# Patient Record
Sex: MALE | Race: WHITE | NOT HISPANIC OR LATINO | Employment: FULL TIME | URBAN - METROPOLITAN AREA
[De-identification: names, ages, dates, MRNs, and addresses within clinical notes are randomized per-mention and may not be internally consistent; named-entity substitution may affect disease eponyms.]

---

## 2021-09-03 ENCOUNTER — OFFICE VISIT (OUTPATIENT)
Dept: UROLOGY | Facility: CLINIC | Age: 21
End: 2021-09-03

## 2021-09-03 VITALS — RESPIRATION RATE: 16 BRPM | HEIGHT: 71 IN | WEIGHT: 141.2 LBS | BODY MASS INDEX: 19.77 KG/M2

## 2021-09-03 DIAGNOSIS — R39.198 DIFFICULTY IN URINATION: ICD-10-CM

## 2021-09-03 DIAGNOSIS — R39.15 URGENCY OF URINATION: Primary | ICD-10-CM

## 2021-09-03 LAB
BILIRUB BLD-MCNC: NEGATIVE MG/DL
CLARITY, POC: CLEAR
COLOR UR: YELLOW
GLUCOSE UR STRIP-MCNC: NEGATIVE MG/DL
KETONES UR QL: NEGATIVE
LEUKOCYTE EST, POC: NEGATIVE
NITRITE UR-MCNC: NEGATIVE MG/ML
PH UR: 7 [PH] (ref 5–8)
PROT UR STRIP-MCNC: NEGATIVE MG/DL
RBC # UR STRIP: NEGATIVE /UL
SCAN: NORMAL
SP GR UR: 1.02 (ref 1–1.03)
UROBILINOGEN UR QL: NORMAL

## 2021-09-03 PROCEDURE — 99203 OFFICE O/P NEW LOW 30 MIN: CPT | Performed by: NURSE PRACTITIONER

## 2021-09-03 PROCEDURE — 51798 US URINE CAPACITY MEASURE: CPT | Performed by: NURSE PRACTITIONER

## 2021-09-03 PROCEDURE — 81003 URINALYSIS AUTO W/O SCOPE: CPT | Performed by: NURSE PRACTITIONER

## 2021-09-03 NOTE — PROGRESS NOTES
"Chief Complaint: Varicocele    Subjective         History of Present Illness  Giovanni Ng is a 20 y.o. male presents to [facility] to be seen for .    He states he tried to join the  and was disqualified from the process as he had a varicocele.    He states he has a \"grade 3 varicocele\"     His father is asking questions about infertility, malignancy, and treatment for this.    The patient has seen [facility] urology in regards to this as well as had imaging performed at [facility].     While in office we were able to obtain the patient's records from [facility] urology.  The patient had a normal genitourinary exam while in office at that time with the exception of the grade 3 varicocele on the right.  Per the office note from [facility] urology the scrotal ultrasound from 6/23/2021 reveals normal testicles bilaterally, abnormally dilated venous structures are noted on the right hemiscrotum but they appear to be localized to the scrotal skin level and not clearly within the scrotal sac.  Moderate sized left varicocele as documented.    It appears that the patient's plan was to call the office with any new voiding symptoms or  issues.  It appears that the physician reviewed how varicocele develops and its role in testicular function i.e. infertility and he was informed to return to the office for palpable mass or scrotal pain or problems with fertility or any other issues.  They recommended no intervention at this point in time and ensured he was able to perform an appropriate self testicular exam.        Objective     History reviewed. No pertinent past medical history.    History reviewed. No pertinent surgical history.    No current outpatient medications on file.    No Known Allergies     History reviewed. No pertinent family history.    Social History     Socioeconomic History   • Marital status: Single     Spouse name: Not on file   • Number of children: Not on file   • Years of " "education: Not on file   • Highest education level: Not on file   Tobacco Use   • Smoking status: Never Smoker   • Smokeless tobacco: Never Used       Vital Signs:   Resp 16   Ht 180.3 cm (71\")   Wt 64 kg (141 lb 3.2 oz)   BMI 19.69 kg/m²      Physical Exam     Result Review :   The following data was reviewed by: KENIA Linda on 09/03/2021:  Results for orders placed or performed in visit on 09/03/21   Bladder Scan   Result Value Ref Range    Scan 012ml    POC Urinalysis Dipstick, Automated    Specimen: Urine   Result Value Ref Range    Color Yellow Yellow, Straw, Dark Yellow, [place]    Clarity, UA Clear Clear    Specific Gravity  1.020 1.005 - 1.030    pH, Urine 7.0 5.0 - 8.0    Leukocytes Negative Negative    Nitrite, UA Negative Negative    Protein, POC Negative Negative mg/dL    Glucose, UA Negative Negative, 1000 mg/dL (3+) mg/dL    Ketones, UA Negative Negative    Urobilinogen, UA Normal Normal    Bilirubin Negative Negative    Blood, UA Negative Negative          Procedures        Assessment and Plan    Diagnoses and all orders for this visit:    1. Urgency of urination (Primary)    2. Difficulty in urination  -     POC Urinalysis Dipstick, Automated    Other orders  -     Bladder Scan        Discussed with patient and patient's father that a left-sided varicocele does not carry an increased risk for malignancy.  We also discussed that should the patient have issues with infertility later in life that go on identified from any other source varicocele may be the cause of this.  We also discussed that surgical intervention is generally not performed unless the patient is having scrotal pain.  Did discuss with the patient that you cannot \"shrink\" the size of a varicocele however he can help to prevent further growth or pain from this with consistent scrotal support and elevation of the scrotum in the evening.    I spent 30 minutes caring for Giovanni on this date of service. This time includes time " spent by me in the following activities:reviewing tests, obtaining and/or reviewing a separately obtained history, performing a medically appropriate examination and/or evaluation , counseling and educating the patient/family/caregiver, ordering medications, tests, or procedures, and documenting information in the medical record  Follow Up   Return if symptoms worsen or fail to improve.  Patient was given instructions and counseling regarding his condition or for health maintenance advice. Please see specific information pulled into the AVS if appropriate.         This document has been electronically signed by KENIA Linda  September 3, 2021 13:58 EDT

## 2021-10-12 ENCOUNTER — TELEPHONE (OUTPATIENT)
Dept: UROLOGY | Facility: CLINIC | Age: 21
End: 2021-10-12

## 2021-10-12 NOTE — TELEPHONE ENCOUNTER
Patient asked for a letter stating that he has a clean bill of health and that he is fit for active duty.  He is trying to join the [employer].  He asked for records from visit.  He can come get.

## 2021-10-12 NOTE — TELEPHONE ENCOUNTER
"I can only speak for the patient urologically if he is wanting a \"clean bill of health\" he will need to see a primary care provider. "

## 2021-10-14 NOTE — TELEPHONE ENCOUNTER
Pt has an varicocele but isn't having any issues at this time. Can we make a note stating this? He is needing this for the

## 2022-02-22 ENCOUNTER — OFFICE VISIT (OUTPATIENT)
Dept: CARDIOLOGY | Facility: CLINIC | Age: 22
End: 2022-02-22

## 2022-02-22 VITALS
WEIGHT: 139.4 LBS | BODY MASS INDEX: 19.44 KG/M2 | DIASTOLIC BLOOD PRESSURE: 64 MMHG | HEART RATE: 72 BPM | SYSTOLIC BLOOD PRESSURE: 111 MMHG

## 2022-02-22 DIAGNOSIS — R94.31 ABNORMAL EKG: Primary | ICD-10-CM

## 2022-02-22 PROCEDURE — 99203 OFFICE O/P NEW LOW 30 MIN: CPT | Performed by: SPECIALIST

## 2022-02-22 NOTE — PROGRESS NOTES
[facility]  Cardiology progress Note    Patient Name: Giovanni Ng  : 2000    CHIEF COMPLAINT  Abnormal EKG      Subjective   Subjective     HISTORY OF PRESENT ILLNESS    Giovanni Ng is a 21 y.o. 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  He denies any chest pain, shortness of breath, palpitations.  He has no syncopal or presyncopal spells.    Review of Systems:   Constitutional no fever,  no weight loss   Skin no rash   Otolaryngeal no difficulty swallowing   Cardiovascular See HPI   Pulmonary no cough, no sputum production   Gastrointestinal no constipation, no diarrhea   Genitourinary no dysuria, no hematuria   Hematologic no easy bruisability, no abnormal bleeding   Musculoskeletal no muscle pain   Neurologic no dizziness, no falls         Personal History     Social History:  reports that he has never smoked. He has never used smokeless tobacco.    Home Medications:  No current outpatient medications on file prior to visit.     No current facility-administered medications on file prior to visit.     Allergies:  No Known Allergies    Objective    Objective       Vitals:   Heart Rate:  [72] 72  BP: (111)/(64) 111/64  Body mass index is 19.44 kg/m².     Physical Exam:   Constitutional: Awake, alert, No acute distress    Eyes: PERRLA, sclerae anicteric, no conjunctival injection   HENT: NCAT, mucous membranes moist   Neck: Supple, no thyromegaly, no lymphadenopathy, trachea midline   Respiratory: Clear to auscultation bilaterally, nonlabored respirations    Cardiovascular: RRR, no murmurs or rubs. Palpable pedal pulses bilaterally   Musculoskeletal: No bilateral ankle edema, no cyanosis to extremities   Psychiatric: Appropriate affect, cooperative   Neurologic: Oriented x 3, strength symmetric in all extremities, Cranial Nerves grossly intact to confrontation, speech clear   Skin: No rashes.    Result Review    Result Review:  I have personally reviewed the available results  from  [x]  Laboratory  [x]  EKG  [x]  Cardiology  [x]  Medications  [x]  Old records  []  Other:   Procedures  EKG reviewed shows sinus rhythm mild intraventricular conduction delay, PAC.  Impression/Plan:  1.  Abnormal EKG abnormal flight physical: 24-hour Holter to evaluate any significant arrhythmias.  Echocardiogram to evaluate any significant valve abnormalities.  Exercise treadmill stress test to evaluate for ischemia and exercise tolerance.        Grady Jeronimo MD   02/22/22   09:40 EST